# Patient Record
Sex: MALE | Race: OTHER | HISPANIC OR LATINO | URBAN - METROPOLITAN AREA
[De-identification: names, ages, dates, MRNs, and addresses within clinical notes are randomized per-mention and may not be internally consistent; named-entity substitution may affect disease eponyms.]

---

## 2018-08-23 ENCOUNTER — EMERGENCY (EMERGENCY)
Facility: HOSPITAL | Age: 38
LOS: 1 days | Discharge: HOME | End: 2018-08-23
Attending: EMERGENCY MEDICINE
Payer: COMMERCIAL

## 2018-08-23 VITALS
TEMPERATURE: 98 F | OXYGEN SATURATION: 99 % | HEART RATE: 64 BPM | DIASTOLIC BLOOD PRESSURE: 90 MMHG | SYSTOLIC BLOOD PRESSURE: 137 MMHG | RESPIRATION RATE: 18 BRPM

## 2018-08-23 VITALS — WEIGHT: 160.06 LBS | HEIGHT: 63 IN

## 2018-08-23 LAB
ALBUMIN SERPL ELPH-MCNC: 4.7 G/DL — SIGNIFICANT CHANGE UP (ref 3.3–5.2)
ALP SERPL-CCNC: 54 U/L — SIGNIFICANT CHANGE UP (ref 40–120)
ALT FLD-CCNC: 15 U/L — SIGNIFICANT CHANGE UP
ANION GAP SERPL CALC-SCNC: 15 MMOL/L — SIGNIFICANT CHANGE UP (ref 5–17)
APTT BLD: 30 SEC — SIGNIFICANT CHANGE UP (ref 27.5–37.4)
AST SERPL-CCNC: 26 U/L — SIGNIFICANT CHANGE UP
BASOPHILS # BLD AUTO: 0 K/UL — SIGNIFICANT CHANGE UP (ref 0–0.2)
BASOPHILS NFR BLD AUTO: 0.1 % — SIGNIFICANT CHANGE UP (ref 0–2)
BILIRUB SERPL-MCNC: 0.3 MG/DL — LOW (ref 0.4–2)
BUN SERPL-MCNC: 10 MG/DL — SIGNIFICANT CHANGE UP (ref 8–20)
CALCIUM SERPL-MCNC: 9.3 MG/DL — SIGNIFICANT CHANGE UP (ref 8.6–10.2)
CHLORIDE SERPL-SCNC: 100 MMOL/L — SIGNIFICANT CHANGE UP (ref 98–107)
CO2 SERPL-SCNC: 23 MMOL/L — SIGNIFICANT CHANGE UP (ref 22–29)
CREAT SERPL-MCNC: 0.83 MG/DL — SIGNIFICANT CHANGE UP (ref 0.5–1.3)
EOSINOPHIL # BLD AUTO: 0 K/UL — SIGNIFICANT CHANGE UP (ref 0–0.5)
EOSINOPHIL NFR BLD AUTO: 0.7 % — SIGNIFICANT CHANGE UP (ref 0–5)
GLUCOSE SERPL-MCNC: 115 MG/DL — SIGNIFICANT CHANGE UP (ref 70–115)
HCT VFR BLD CALC: 42.5 % — SIGNIFICANT CHANGE UP (ref 42–52)
HGB BLD-MCNC: 14.8 G/DL — SIGNIFICANT CHANGE UP (ref 14–18)
INR BLD: 0.97 RATIO — SIGNIFICANT CHANGE UP (ref 0.88–1.16)
LYMPHOCYTES # BLD AUTO: 2.1 K/UL — SIGNIFICANT CHANGE UP (ref 1–4.8)
LYMPHOCYTES # BLD AUTO: 28.6 % — SIGNIFICANT CHANGE UP (ref 20–55)
MCHC RBC-ENTMCNC: 30.1 PG — SIGNIFICANT CHANGE UP (ref 27–31)
MCHC RBC-ENTMCNC: 34.8 G/DL — SIGNIFICANT CHANGE UP (ref 32–36)
MCV RBC AUTO: 86.6 FL — SIGNIFICANT CHANGE UP (ref 80–94)
MONOCYTES # BLD AUTO: 0.5 K/UL — SIGNIFICANT CHANGE UP (ref 0–0.8)
MONOCYTES NFR BLD AUTO: 6.3 % — SIGNIFICANT CHANGE UP (ref 3–10)
NEUTROPHILS # BLD AUTO: 4.7 K/UL — SIGNIFICANT CHANGE UP (ref 1.8–8)
NEUTROPHILS NFR BLD AUTO: 64.2 % — SIGNIFICANT CHANGE UP (ref 37–73)
PLATELET # BLD AUTO: 211 K/UL — SIGNIFICANT CHANGE UP (ref 150–400)
POTASSIUM SERPL-MCNC: 3.9 MMOL/L — SIGNIFICANT CHANGE UP (ref 3.5–5.3)
POTASSIUM SERPL-SCNC: 3.9 MMOL/L — SIGNIFICANT CHANGE UP (ref 3.5–5.3)
PROT SERPL-MCNC: 7.6 G/DL — SIGNIFICANT CHANGE UP (ref 6.6–8.7)
PROTHROM AB SERPL-ACNC: 10.7 SEC — SIGNIFICANT CHANGE UP (ref 9.8–12.7)
RBC # BLD: 4.91 M/UL — SIGNIFICANT CHANGE UP (ref 4.6–6.2)
RBC # FLD: 13 % — SIGNIFICANT CHANGE UP (ref 11–15.6)
SODIUM SERPL-SCNC: 138 MMOL/L — SIGNIFICANT CHANGE UP (ref 135–145)
WBC # BLD: 7.3 K/UL — SIGNIFICANT CHANGE UP (ref 4.8–10.8)
WBC # FLD AUTO: 7.3 K/UL — SIGNIFICANT CHANGE UP (ref 4.8–10.8)

## 2018-08-23 PROCEDURE — 85027 COMPLETE CBC AUTOMATED: CPT

## 2018-08-23 PROCEDURE — 70551 MRI BRAIN STEM W/O DYE: CPT

## 2018-08-23 PROCEDURE — 99284 EMERGENCY DEPT VISIT MOD MDM: CPT | Mod: 25

## 2018-08-23 PROCEDURE — 80053 COMPREHEN METABOLIC PANEL: CPT

## 2018-08-23 PROCEDURE — T1013: CPT

## 2018-08-23 PROCEDURE — 36415 COLL VENOUS BLD VENIPUNCTURE: CPT

## 2018-08-23 PROCEDURE — 70450 CT HEAD/BRAIN W/O DYE: CPT | Mod: 26

## 2018-08-23 PROCEDURE — 85610 PROTHROMBIN TIME: CPT

## 2018-08-23 PROCEDURE — 85730 THROMBOPLASTIN TIME PARTIAL: CPT

## 2018-08-23 PROCEDURE — 70544 MR ANGIOGRAPHY HEAD W/O DYE: CPT

## 2018-08-23 PROCEDURE — 70450 CT HEAD/BRAIN W/O DYE: CPT

## 2018-08-23 PROCEDURE — 99284 EMERGENCY DEPT VISIT MOD MDM: CPT

## 2018-08-23 PROCEDURE — 70551 MRI BRAIN STEM W/O DYE: CPT | Mod: 26

## 2018-08-23 PROCEDURE — 70544 MR ANGIOGRAPHY HEAD W/O DYE: CPT | Mod: 26,76,59

## 2018-08-23 NOTE — ED ADULT NURSE NOTE - NSIMPLEMENTINTERV_GEN_ALL_ED
Implemented All Universal Safety Interventions:  [place] to call system. Call bell, personal items and telephone within reach. Instruct patient to call for assistance. Room bathroom lighting operational. Non-slip footwear when patient is off stretcher. Physically safe environment: no spills, clutter or unnecessary equipment. Stretcher in lowest position, wheels locked, appropriate side rails in place.

## 2018-08-23 NOTE — ED ADULT NURSE NOTE - OBJECTIVE STATEMENT
Patient states that he has been having pain behind his left eye for the last three weeks. Patient states that he went to [facility] and was told to come to the ED for "Papilledema associated with increased ICP". Patient states that his vision is blurry in both eyes but worse in the left. Patient c/o a headache. Patient states that this happened about 6-7 years ago

## 2018-08-23 NOTE — ED ADULT NURSE NOTE - CHIEF COMPLAINT QUOTE
Sent from [facility] for "emergency work up on papilledema" "papilledema associated with increased ICP"  Pt c/o left eye pain and difficulty seeing X 3 weeks. Statement Selected

## 2018-08-23 NOTE — ED PROVIDER NOTE - ATTENDING CONTRIBUTION TO CARE
The patient seen and examined.    Headache  Pseudotumor Cerebri    I, Roni Patton, performed the initial face to face bedside interview with this patient regarding history of present illness, review of symptoms and relevant past medical, social and family history.  I completed an independent physical examination.  I was the initial provider who evaluated this patient. I have signed out the follow up of any pending tests (i.e. labs, radiological studies) to the ACP.  I have communicated the patient’s plan of care and disposition with the ACP.

## 2018-08-23 NOTE — ED PROVIDER NOTE - MEDICAL DECISION MAKING DETAILS
38 yr olf M presented to ED with frontal headache and papilledema . Pt explained that he have been experiencing headache for the last 3 weeks and his vision in his left eye he noticed got blurry yesterday. Pt denies any visual changes in his right eye. Pt denies any dizziness, nausea, vomiting or fever. Pt explained that he had similar episode x 6 years ago and was treated with steroids. spoke with pt at length re: risks of AMA, including death and disability, aox4, ambulatory, has capacity to make decisions, not clinically intoxicated, going home by private transport, provided discharged instructions and aware that can return if change mind about medical treatment

## 2018-08-23 NOTE — ED ADULT NURSE NOTE - CHPI ED NUR SYMPTOMS NEG
no purulent drainage/no photophobia/no itching/no discharge/no foreign body/no drainage/no eye lid swelling

## 2018-08-23 NOTE — ED STATDOCS - PROGRESS NOTE DETAILS
39 y/o M pt presents to ED for left eye vision loss x 3 weeks. Pt sent from ophthalmologist for "emergency workup on papilledema associated with increased ICP". Pt has had similar symptoms in past, 7 years ago. Had MRI in the past, where nothing was found. Pt will be placed in the main ED for complete evaluation by another provider.

## 2018-08-23 NOTE — ED PROVIDER NOTE - OBJECTIVE STATEMENT
38 yr olf M presented to ED with frontal headache and papilledema . Pt explained that he have been experiencing headache for the last 3 weeks and his vision in his left eye he noticed got blurry yesterday. Pt denies any visual changes in his right eye. Pt denies any dizziness, nausea, vomiting or fever. Pt explained that he had similar episode x 6 years ago and was treated with steroids. Pt denies any significant past medical history but admits to left clavicular fracture and repair.

## 2018-08-23 NOTE — ED PROVIDER NOTE - CARE PLAN
Principal Discharge DX:	Headache  Assessment and plan of treatment:	F/U with PCP  Secondary Diagnosis:	Acute sinusitis

## 2018-08-23 NOTE — ED ADULT TRIAGE NOTE - CHIEF COMPLAINT QUOTE
Sent from [facility] for "emergency work up on papilledema" "papilledema associated with increased ICP"  Pt c/o left eye pain and difficulty seeing X 3 weeks.

## 2018-08-23 NOTE — ED ADULT NURSE REASSESSMENT NOTE - NS ED NURSE REASSESS COMMENT FT1
Assumed pt care at 1430.  Pt awake, alert and oriented x3 c/o ocular pain and blurred vision to left eye x3 weeks.  Denies nausea or vomiting.  Respirations even and unlabored.  No further complaints at this time.  Pt made aware of plan of care.  Will continue to monitor.

## 2018-08-23 NOTE — ED PROVIDER NOTE - PROGRESS NOTE DETAILS
Pt neuro examination normal . CT head ordered and normal . Pt labs are all within normal limits. Pt results discussed with patient. Pt informed and understands that he needs MRI and MRI ordered. The patient refusing LP.  The risks of not getting LP discussed. Multiple attempts made to get LP but refused by patient. Case d/w Dr Pickard of Neurology and agree with plan to get MRI and also LP.  The patient refusing LP.  The risks of not getting LP discussed. Multiple attempts made to get LP but refused by patient. Patient s/o to Dr Nathan to follow up with MRI results MRI result reviewed and pt  informed about results. Pt D/C AMA due to refusal of Lumbar puncture. spoke with pt at length re: risks of AMA, including death and disability, aox4, ambulatory, has capacity to make decisions, not clinically intoxicated, going home by private transport, provided discharged instructions and aware that can return if change mind about medical treatment

## 2018-08-23 NOTE — ED PROVIDER NOTE - PHYSICAL EXAMINATION
A&Ox3,follows command, responds appropriately  CN: II-XII : Conjugate gaze, PERRLA, Visual field full to confrontation, EOMI with out nystagmus, pursuit smooth without saccade.  Facial: Sensation and muscle activation intact bilateral. Hearing intact bilateral  Palate: Elevate symmetrically . Shoulder shrug and neck turn full strength. Tongue midline  Motor: UE and LE strength 5/5  Sensory : pin prick and temp intact and equal bilaterally. Vibration and  proprioception intact bilaterally   Reflex : Biceps           brachioradials  triceps patella achilles  L              2+                2+                   2+       2+       2+  R              2+                2+                  2 +       2+       2+  Cerebellar: Rapid alternating movement and regular rhythm without bradykinesia                      Finger to nose and heel-to-shin intact bilaterally without dysmetria or overshoot  Gait narrow base. No shuffling. Full hip flexion and knee flexion. Negative Romberg  No involuntary movement . No pronotor drift. No clonus.

## 2020-10-26 ENCOUNTER — OUTPATIENT (OUTPATIENT)
Dept: OUTPATIENT SERVICES | Facility: HOSPITAL | Age: 40
LOS: 1 days | End: 2020-10-26
Payer: COMMERCIAL

## 2020-10-26 DIAGNOSIS — Z11.59 ENCOUNTER FOR SCREENING FOR OTHER VIRAL DISEASES: ICD-10-CM

## 2020-10-26 LAB — SARS-COV-2 RNA SPEC QL NAA+PROBE: SIGNIFICANT CHANGE UP

## 2020-10-26 PROCEDURE — U0003: CPT

## 2021-10-20 NOTE — ED ADULT NURSE NOTE - HOW MANY DRINKS CONTAINING ALCOHOL DO YOU HAVE ON A TYPICAL DAY WHEN YOU ARE DRINKING?
CC:   Chief Complaint   Patient presents with   • Office Visit   • Eye Exam     having trouble with near, some irritation on inner corner of Left Eye eyelid   • Diabetes     Hemoglobin A1C (%)       Date                     Value                 12/11/2020               8.0 (H)          ----------           HPI:  Agree with tech note. Irritation of left eye nasal canthus is recent and without pain or itch      Fibromyalgia                                                  Hyperlipidemia                                                Hypothyroidism                                                History of breast cancer                        01/01/2003      Comment: left    Depression                                                    Osteoporosis                                    12/03/2010    Mass of breast, right                                           Comment: benign    Gall bladder stones                                           Scoliosis                                                     HTN (hypertension)                                            RLQ abdominal pain                                              Comment: intermittent    Right hip pain                                                  Comment: MRI in 2007 showed developing degenerative                changes    Reactive airway disease                                       Lymphedema                                                    Plantar fasciitis                                             Herniated disc                                                Tremor                                                        Rotator cuff tear, right                        2013          Restrictive lung disease                                      Pulmonary hypertension (CMS/HCC)                              PONV (postoperative nausea and vomiting)                    Past Surgical History:   Procedure Laterality Date   • Breast lumpectomy  01/01/2003     w/lymph node dissection, chemo and radiation   • Breast mass excision  2005    right, benign   • Colonoscopy  2009   • Colonoscopy diagnostic  2008   • Ovarian cyst removal     • Tubal ligation       Family History   Problem Relation Age of Onset   • Cardiac Dysrhythmia Mother    • Hypertension Mother    • Hyperlipidemia Mother    • Diabetes Mother    • Macular degeneration Mother    • Patient is unaware of any medical problems Father    • Cardiac Dysrhythmia Son    • Heart Son 14         at 14/Cardiomegaly     Social History     Socioeconomic History   • Marital status: /Civil Union     Spouse name: Not on file   • Number of children: Not on file   • Years of education: Not on file   • Highest education level: Not on file   Occupational History   • Occupation: retired   Tobacco Use   • Smoking status: Never Smoker   • Smokeless tobacco: Never Used   Vaping Use   • Vaping Use: Never assessed   Substance and Sexual Activity   • Alcohol use: No     Comment: rarely   • Drug use: No   • Sexual activity: Yes     Partners: Male     Birth control/protection: Post-menopausal, Surgical     Comment: tubal ligation   Other Topics Concern   • Not on file   Social History Narrative   • Not on file     Social Determinants of Health     Financial Resource Strain:    • Social Determinants: Financial Resource Strain: Not on file   Food Insecurity:    • Social Determinants: Food Insecurity: Not on file   Transportation Needs:    • Lack of Transportation (Medical): Not on file   • Lack of Transportation (Non-Medical): Not on file   Physical Activity:    • Days of Exercise per Week: Not on file   • Minutes of Exercise per Session: Not on file   Stress:    • Social Determinants: Stress: Not on file   Social Connections:    • Social Determinants: Social Connections: Not on file   Intimate Partner Violence:    • Social Determinants: Intimate Partner Violence Past Fear: Not on file   • Social Determinants:  Intimate Partner Violence Current Fear: Not on file       Rooming documentation of social history includes tobacco screening only.      REVIEW OF SYSTEMS:  Eye Problem(s):visual blurring and glasses  ENT Problem(s):negative  Cardiovascular problem(s):negative  Respiratory problem(s):negative  Gastro-intestinal problem(s):negative  Genito-urinary problem(s):negative  Musculoskeletal problem(s):negative  Integumentary problem(s):negative  Neurological problem(s):negative  Psychiatric problem(s):negative  Endocrine problem(s):diabetes  Hematologic and/or Lymphatic problem(s):negative    ASSESSMENT   1. Type 2 diabetes mellitus without retinopathy (CMS/HCC)    2. Foreign body of left conjunctival sac, initial encounter    3. Combined forms of age-related cataract of both eyes    4. Hyperopia of both eyes    5. Presbyopia      PLAN  1. No diabetic retinopathy observed. Send eye report to PCP  2. Irritation of left nasal canthus secondary to foreign body (fiber). Able to remove in office with cotton-tipped applicator  3. Mild reduction of best corrected visual acuity due to cataracts. Patient is experiencing no impact on activities of daily living. Advised blood sugar control and UV protection when outdoors to slow progression. Plan to reevaluate in 1 year  4-5. Release spectacle Rx. Near single vision correction preferred per patient request  6.Dilated Complete Exam in 1 Year or immediately prn as instructed for pain, redness, or decreased vision    Right Eye:  No Retinopathy    Left Eye:  No Retinopathy       1 or 2

## 2022-01-05 ENCOUNTER — OUTPATIENT (OUTPATIENT)
Dept: OUTPATIENT SERVICES | Facility: HOSPITAL | Age: 42
LOS: 1 days | End: 2022-01-05
Payer: COMMERCIAL

## 2022-01-05 DIAGNOSIS — Z20.828 CONTACT WITH AND (SUSPECTED) EXPOSURE TO OTHER VIRAL COMMUNICABLE DISEASES: ICD-10-CM

## 2022-01-06 LAB — SARS-COV-2 RNA SPEC QL NAA+PROBE: SIGNIFICANT CHANGE UP

## 2022-01-06 PROCEDURE — U0003: CPT

## 2022-01-06 PROCEDURE — U0005: CPT

## 2022-05-20 ENCOUNTER — EMERGENCY (EMERGENCY)
Facility: HOSPITAL | Age: 42
LOS: 1 days | Discharge: HOME | End: 2022-05-20
Attending: EMERGENCY MEDICINE | Admitting: EMERGENCY MEDICINE
Payer: COMMERCIAL

## 2022-05-20 VITALS
OXYGEN SATURATION: 99 % | DIASTOLIC BLOOD PRESSURE: 70 MMHG | SYSTOLIC BLOOD PRESSURE: 132 MMHG | HEART RATE: 74 BPM | RESPIRATION RATE: 16 BRPM

## 2022-05-20 VITALS
WEIGHT: 160.06 LBS | TEMPERATURE: 97 F | OXYGEN SATURATION: 100 % | SYSTOLIC BLOOD PRESSURE: 136 MMHG | HEIGHT: 64.96 IN | HEART RATE: 78 BPM | DIASTOLIC BLOOD PRESSURE: 72 MMHG | RESPIRATION RATE: 16 BRPM

## 2022-05-20 PROCEDURE — 71046 X-RAY EXAM CHEST 2 VIEWS: CPT | Mod: 26

## 2022-05-20 PROCEDURE — 71100 X-RAY EXAM RIBS UNI 2 VIEWS: CPT

## 2022-05-20 PROCEDURE — 99053 MED SERV 10PM-8AM 24 HR FAC: CPT

## 2022-05-20 PROCEDURE — 99284 EMERGENCY DEPT VISIT MOD MDM: CPT

## 2022-05-20 PROCEDURE — 71100 X-RAY EXAM RIBS UNI 2 VIEWS: CPT | Mod: 26

## 2022-05-20 PROCEDURE — 71046 X-RAY EXAM CHEST 2 VIEWS: CPT

## 2022-05-20 PROCEDURE — 99284 EMERGENCY DEPT VISIT MOD MDM: CPT | Mod: 25

## 2022-05-20 RX ORDER — IBUPROFEN 200 MG
600 TABLET ORAL ONCE
Refills: 0 | Status: COMPLETED | OUTPATIENT
Start: 2022-05-20 | End: 2022-05-20

## 2022-05-20 RX ADMIN — Medication 600 MILLIGRAM(S): at 07:26

## 2022-05-20 NOTE — ED PROVIDER NOTE - NSFOLLOWUPINSTRUCTIONS_ED_ALL_ED_FT
Contusión costal    Rib Contusion      La contusión costal es un moretón profundo en la corina de las costillas. Las contusiones son el resultado de un traumatismo cerrado que causa hemorragia y lesión en los tejidos subcutáneos. La contusión costal puede incluir hematomas en las costillas, en la piel y los músculos de la corina. La piel sobre la contusión puede tornarse de color zac, bienvenido o amarillo. Las lesiones leves provocan vesna contusión indolora. Las contusiones más graves pueden causar dolor y estar hinchadas gertrudis algunas semanas.      ¿Cuáles son las causas?    Generalmente, esta afección se debe a un golpe directo y con fuerza en vesna corina del cuerpo. [place] suele ocurrir mientras se practican deportes de contacto.      ¿Cuáles son los signos o síntomas?    Los síntomas de esta afección incluyen:  •Hinchazón y enrojecimiento en la corina de la lesión.      •Cambio de color de la corina lesionada.      •Dolor con la palpación y sensibilidad en la corina de la lesión.      •Dolor al moverse o al estar quieto.      •Dolor al inspirar.        ¿Cómo se diagnostica?    Esta afección se puede diagnosticar en función de lo siguiente:  •Los síntomas y los antecedentes médicos.      •Un examen físico.      •Estudios de diagnóstico por imágenes, kimmy vesna radiografía, exploración por tomografía computarizada (TC) o resonancia magnética (RM), para determinar si hubo lesiones internas o huesos fracturados (fracturas).        ¿Cómo se trata?    El tratamiento de esta afección puede incluir:  •Maple. A menudo, jerry es el mejor tratamiento para vesna contusión costal.      •Compresas de hielo. [place] reducirá la hinchazón y la inflamación.      •Practicar ejercicios de respiración profunda. [place] se puede recomendar para reducir el riesgo de colapso pulmonar parcial y neumonía.      •Medicamentos. Es posible que también deba raghavendra medicamentos recetados y de venta daya para controlar el dolor.      •Inyección de un anestésico alrededor del nervio cerca de la lesión (bloqueo nervioso).        Siga estas instrucciones en kolb casa:    Medicamentos     •Use los medicamentos de venta daya y los recetados solamente kimmy se lo haya indicado el médico.    •Pregúntele al médico si el medicamento recetado:  •Hace necesario que evite conducir o usar maquinaria.    •Puede causarle estreñimiento. Es posible que tenga que raghavendra estas medidas para prevenir o tratar el estreñimiento:  •Beber suficiente líquido kimmy para mantener la orina de color amarillo pálido.       •Usar medicamentos recetados o de venta daya.      •Consumir alimentos ricos en fibra, kimmy frijoles, cereales integrales, y frutas y verduras frescas.      •Limitar el consumo de alimentos ricos en grasa y azúcares procesados, kimmy los alimentos fritos o dulces.             Control del dolor, la rigidez y la hinchazón      Si se lo indican, aplique hielo sobre la corina de la lesión. Para hacer esto:  •Ponga el hielo en vesna bolsa plástica.      •Coloque vesna toalla entre la piel y la bolsa.      •Aplique el hielo gertrudis 20 minutos, 2 o 3 veces por día.      •Retire el hielo si la piel se pone de color leos brillante. [place] es muy importante. Si no puede sentir dolor, calor o frío, tiene un mayor riesgo de dañar la corina.      Actividad      •Mantenga la corina de la lesión en reposo.      •No realice actividades extenuantes ni actividades o movimientos que le causen dolor. Sea cuidadoso al realizar actividades y evite golpearse la corina lesionada.      • No levante ningún objeto que pese más de 5 libras (2.3 kg) o que supere el límite de peso que le hayan indicado, hasta que el médico le diga que puede hacerlo.        Indicaciones generales      • No consuma ningún producto que contenga nicotina o tabaco, kimmy cigarrillos, cigarrillos electrónicos y tabaco de mascar. Estos pueden retrasar la recuperación. Si necesita ayuda para dejar de consumir estos productos, consulte al médico.      •Jerome ejercicios de respiración profunda kimmy se lo haya indicado el médico.      •Si le entregan un espirómetro incentivo, úselo cada 1 o 2 horas mientras está despierto o según lo recomendado por kolb médico. Jerry dispositivo mide qué santos sandip llena los pulmones con cada respiración.      •Cumpla con todas las visitas de seguimiento. [place] es importante.        Comuníquese con un médico si tiene:    •Aumento del hematoma o la hinchazón.      •Dolor no se alejandra con el tratamiento.      •Fiebre.        Busca ayuda de inmediato si:    •Tiene dificultad para respirar o le falta el aire.      •Tiene vesna tos continua o expectora mucosidad espesa o con massimo de los pulmones al toser (esputo).      •Siente náuseas o vomita.      •Tiene dolor en el abdomen.      Estos síntomas pueden representar un problema grave que constituye vesna emergencia. No espere a annia si los síntomas desaparecen. Solicite atención médica de inmediato. Comuníquese con el servicio de emergencias de kolb localidad (911 en los [place]). No conduzca por elsa propios medios hasta el hospital.       Resumen    •La contusión costal es un moretón profundo en la corina de las costillas. Las contusiones son el resultado de un traumatismo cerrado que causa hemorragia y lesión en los tejidos subcutáneos.      •La piel sobre la contusión puede tornarse de color zac, bienvenido o amarillo. Las lesiones menores pueden causar vesna contusión indolora. Las contusiones más graves pueden causar dolor y estar hinchadas gertrudis algunas semanas.      •Mantenga la corina de la lesión en reposo. No realice actividades extenuantes ni actividades o movimientos que le causen dolor.      Esta información no tiene kimmy fin reemplazar el consejo del médico. Asegúrese de hacerle al médico cualquier pregunta que tenga.

## 2022-05-20 NOTE — ED PROVIDER NOTE - PATIENT PORTAL LINK FT
You can access the FollowMyHealth Patient Portal offered by [facility] by registering at the following website: [email or web address]. By joining [employer]’s FollowMyHealth portal, you will also be able to view your health information using other applications (apps) compatible with our system.

## 2022-05-20 NOTE — ED PROVIDER NOTE - PHYSICAL EXAMINATION
Gen: alert, NAD  HEENT:  NC/AT, PERR, no exophthalmos  CV:  well perfused, rrr   Pulm:  normal RR, I/E ratio and chest excursion  Abd: s/nt/nd  MSK: R upper back pain  Neuro:  non-focal  Skin:  visualized areas intact  Psych: AOx3

## 2022-05-20 NOTE — ED PROVIDER NOTE - OBJECTIVE STATEMENT
pt slipped on concrete this morning and landed on his R back/torso area, c/o 5/10 pain, worse with movement, no loc, no head trauma, no n/v, slightly increased pain with deep breath.
